# Patient Record
Sex: FEMALE | Race: WHITE | NOT HISPANIC OR LATINO | Employment: FULL TIME | URBAN - METROPOLITAN AREA
[De-identification: names, ages, dates, MRNs, and addresses within clinical notes are randomized per-mention and may not be internally consistent; named-entity substitution may affect disease eponyms.]

---

## 2024-04-29 RX ORDER — HYDROXYZINE HYDROCHLORIDE 25 MG/1
25 TABLET, FILM COATED ORAL 3 TIMES DAILY PRN
COMMUNITY

## 2024-04-29 RX ORDER — FERROUS SULFATE 325(65) MG
325 TABLET ORAL
COMMUNITY

## 2024-04-29 RX ORDER — SEMAGLUTIDE 1.34 MG/ML
INJECTION, SOLUTION SUBCUTANEOUS WEEKLY
COMMUNITY

## 2024-04-29 NOTE — PROGRESS NOTES
Hematology/Oncology Outpatient Consultation    Patient name: Chanda Garcia  : 1990  MRN: [id number]  Primary Care Physician: Nadine Rod APRN  Referring Physician: Nadine Rod APRN  Reason For Consult:     Chief Complaint   Patient presents with    Appointment     Elevated platelet count       History of Present Illness:    This is a 33-year-old female has referred secondary to thrombocytosis.  Review of her CBC 2024 white count was 8, hemoglobin 11.6 and platelets where 493 differential is where 60% neutrophils 29% lymphocytes there is no monocytosis eosinophilia or basophilia.  B12 was 1888, ferritin was 134 the rest of the iron panel was within normal limits    Today 2024, white count is 9.5 hemoglobin is 11.7 platelets are down to 355 and essentially unremarkable differentials.    Her menstrual cycle are q 28 days  and lasts 4 to 5 days each time.She denies any rectal  bleeding or blood in urine.    She has a long time history of anemia and has been on iron tablets one a day for 2 years.      She has been referred for thrombocytosis    She denies any smoking or alcohol consumption    She has family hx of cancer mat GF with melanoma,  Father with Kidney cancer, Mother with melanoma    Past Medical History:   Diagnosis Date    Anxiety and depression     Elevated platelet count     History of substance abuse     CAR (iron deficiency anemia)     Obesity     Panic attacks     Sciatica of left side     Vitamin D deficiency        No past surgical history on file.      Current Outpatient Medications:     Cholecalciferol 25 MCG (1000 UT) tablet, Take 1 tablet by mouth Daily., Disp: , Rfl:     Cyanocobalamin 500 MCG/0.1ML solution, into the nostril(s) as directed by provider., Disp: , Rfl:     escitalopram (LEXAPRO) 20 MG tablet, Take 1 tablet by mouth Daily., Disp: , Rfl:     ferrous sulfate 325 (65 FE) MG tablet, Take 1 tablet by mouth Daily With Breakfast., Disp: , Rfl:     hydrOXYzine  (ATARAX) 25 MG tablet, Take 1 tablet by mouth 3 (Three) Times a Day As Needed for Itching., Disp: , Rfl:     meloxicam (MOBIC) 15 MG tablet, Take 1 tablet by mouth Daily., Disp: , Rfl:     Semaglutide,0.25 or 0.5MG/DOS, (Ozempic, 0.25 or 0.5 MG/DOSE,) 2 MG/1.5ML solution pen-injector, Inject  under the skin into the appropriate area as directed 1 (One) Time Per Week., Disp: , Rfl:     hydroCHLOROthiazide 12.5 MG tablet, Take 1 tablet by mouth Daily. (Patient not taking: Reported on 5/2/2024), Disp: , Rfl:     hydrOXYzine pamoate (VISTARIL) 50 MG capsule, Take 1 capsule by mouth Every 8 (Eight) Hours As Needed. (Patient not taking: Reported on 5/2/2024), Disp: , Rfl:     ondansetron (ZOFRAN) 4 MG tablet, TAKE 1 TABLET BY MOUTH EVERY 4-6 HOURS AS NEEDED FOR NAUSEA (Patient not taking: Reported on 5/2/2024), Disp: , Rfl:     No Known Allergies      There is no immunization history on file for this patient.    No family history on file.    Cancer-related family history is not on file.         ROS:    Review of Systems   Constitutional:  Negative for chills, fatigue and fever.   HENT:  Negative for congestion, drooling, ear discharge, rhinorrhea, sinus pressure and tinnitus.    Eyes:  Negative for photophobia, pain and discharge.   Respiratory:  Negative for apnea, choking and stridor.    Cardiovascular:  Negative for palpitations.   Gastrointestinal:  Negative for abdominal distention, abdominal pain and anal bleeding.   Endocrine: Negative for polydipsia and polyphagia.   Genitourinary:  Negative for decreased urine volume, flank pain and genital sores.   Musculoskeletal:  Negative for gait problem, neck pain and neck stiffness.   Skin:  Negative for color change, rash and wound.   Neurological:  Negative for tremors, seizures, syncope, facial asymmetry and speech difficulty.   Hematological:  Negative for adenopathy.   Psychiatric/Behavioral:  Negative for agitation, confusion, hallucinations and self-injury. The  "patient is not hyperactive.        Objective:    Vitals:    05/02/24 0853   BP: 104/72   Pulse: 87   Resp: 18   Temp: 98 °F (36.7 °C)   TempSrc: Infrared   SpO2: 98%   Weight: 89.4 kg (197 lb)   Height: 157.5 cm (62\")   PainSc: 0-No pain     Body mass index is 36.03 kg/m².    ECOG    (0) Fully active, able to carry on all predisease performance without restriction    Physical Exam:  Physical Exam  Vitals and nursing note reviewed.   Constitutional:       General: She is not in acute distress.     Appearance: She is not diaphoretic.   HENT:      Head: Normocephalic and atraumatic.   Eyes:      General: No scleral icterus.        Right eye: No discharge.         Left eye: No discharge.      Conjunctiva/sclera: Conjunctivae normal.   Neck:      Thyroid: No thyromegaly.   Cardiovascular:      Rate and Rhythm: Normal rate and regular rhythm.      Heart sounds: Normal heart sounds.      No friction rub. No gallop.   Pulmonary:      Effort: Pulmonary effort is normal. No respiratory distress.      Breath sounds: No stridor. No wheezing.   Abdominal:      General: Bowel sounds are normal.      Palpations: Abdomen is soft. There is no mass.      Tenderness: There is no abdominal tenderness. There is no guarding or rebound.   Musculoskeletal:         General: No tenderness. Normal range of motion.      Cervical back: Normal range of motion and neck supple.   Lymphadenopathy:      Cervical: No cervical adenopathy.   Skin:     General: Skin is warm.      Findings: No erythema or rash.   Neurological:      Mental Status: She is alert and oriented to person, place, and time.      Motor: No abnormal muscle tone.   Psychiatric:         Behavior: Behavior normal.         RECENT LABS  WBC   Date Value Ref Range Status   05/02/2024 9.59 3.40 - 10.80 10*3/mm3 Final     RBC   Date Value Ref Range Status   05/02/2024 4.22 3.77 - 5.28 10*6/mm3 Final     Hemoglobin   Date Value Ref Range Status   05/02/2024 11.7 (L) 12.0 - 15.9 g/dL Final " "    Hematocrit   Date Value Ref Range Status   05/02/2024 35.2 34.0 - 46.6 % Final     MCV   Date Value Ref Range Status   05/02/2024 83.4 79.0 - 97.0 fL Final     MCH   Date Value Ref Range Status   05/02/2024 27.7 26.6 - 33.0 pg Final     MCHC   Date Value Ref Range Status   05/02/2024 33.2 31.5 - 35.7 g/dL Final     RDW   Date Value Ref Range Status   05/02/2024 13.8 12.3 - 15.4 % Final     RDW-SD   Date Value Ref Range Status   05/02/2024 40.9 37.0 - 54.0 fl Final     MPV   Date Value Ref Range Status   05/02/2024 10.2 6.0 - 12.0 fL Final     Platelets   Date Value Ref Range Status   05/02/2024 355 140 - 450 10*3/mm3 Final     Neutrophil %   Date Value Ref Range Status   05/02/2024 51.1 42.7 - 76.0 % Final     Lymphocyte %   Date Value Ref Range Status   05/02/2024 37.5 19.6 - 45.3 % Final     Monocyte %   Date Value Ref Range Status   05/02/2024 8.1 5.0 - 12.0 % Final     Eosinophil %   Date Value Ref Range Status   05/02/2024 3.0 0.3 - 6.2 % Final     Basophil %   Date Value Ref Range Status   05/02/2024 0.3 0.0 - 1.5 % Final     Neutrophils, Absolute   Date Value Ref Range Status   05/02/2024 4.89 1.70 - 7.00 10*3/mm3 Final     Lymphocytes, Absolute   Date Value Ref Range Status   05/02/2024 3.60 (H) 0.70 - 3.10 10*3/mm3 Final     Monocytes, Absolute   Date Value Ref Range Status   05/02/2024 0.78 0.10 - 0.90 10*3/mm3 Final     Eosinophils, Absolute   Date Value Ref Range Status   05/02/2024 0.29 0.00 - 0.40 10*3/mm3 Final     Basophils, Absolute   Date Value Ref Range Status   05/02/2024 0.03 0.00 - 0.20 10*3/mm3 Final       No results found for: \"GLUCOSE\", \"BUN\", \"CREATININE\", \"EGFRIFNONA\", \"EGFRIFAFRI\", \"BCR\", \"K\", \"CO2\", \"CALCIUM\", \"PROTENTOTREF\", \"ALBUMIN\", \"LABIL2\", \"BILIRUBIN\", \"AST\", \"ALT\"      Assessment & Plan   Elevated platelet count  - CBC & Differential      Normocytic anemia, suspect iron deficiency  Thrombocytosis which is most likely reactive        Plans:        Will complete anemia workup " by checking serum transferrin receptor assay, MMA, SPEP with DEWEY reticulocyte count LDH and haptoglobin  Also included his JAK2 analysis, BCR-ABL and serum EPO level  Follow-up 4 weeks  CBC reviewed today her white count is 9.5 hemoglobin 11.7 platelets 355 which is normal  All questions answered          I spent 45 total minutes, face-to-face, caring for Chanda today. 90% of this time involved counseling and/or coordination of care as documented within this note.

## 2024-05-02 ENCOUNTER — CONSULT (OUTPATIENT)
Dept: ONCOLOGY | Facility: CLINIC | Age: 34
End: 2024-05-02
Payer: COMMERCIAL

## 2024-05-02 ENCOUNTER — LAB (OUTPATIENT)
Dept: LAB | Facility: HOSPITAL | Age: 34
End: 2024-05-02
Payer: COMMERCIAL

## 2024-05-02 VITALS
TEMPERATURE: 98 F | DIASTOLIC BLOOD PRESSURE: 72 MMHG | SYSTOLIC BLOOD PRESSURE: 104 MMHG | HEIGHT: 62 IN | WEIGHT: 197 LBS | OXYGEN SATURATION: 98 % | BODY MASS INDEX: 36.25 KG/M2 | RESPIRATION RATE: 18 BRPM | HEART RATE: 87 BPM

## 2024-05-02 DIAGNOSIS — R79.89 ELEVATED PLATELET COUNT: Primary | ICD-10-CM

## 2024-05-02 DIAGNOSIS — R79.89 ELEVATED PLATELET COUNT: ICD-10-CM

## 2024-05-02 PROBLEM — F11.20 OPIOID DEPENDENCE: Status: ACTIVE | Noted: 2017-07-31

## 2024-05-02 PROBLEM — R10.11 RIGHT UPPER QUADRANT PAIN: Status: ACTIVE | Noted: 2017-07-31

## 2024-05-02 PROBLEM — K21.9 GASTROESOPHAGEAL REFLUX DISEASE: Status: ACTIVE | Noted: 2017-09-07

## 2024-05-02 PROBLEM — R03.0 FINDING OF ABOVE NORMAL BLOOD PRESSURE: Status: ACTIVE | Noted: 2017-07-31

## 2024-05-02 PROBLEM — F17.210 CIGARETTE SMOKER: Status: ACTIVE | Noted: 2017-07-31

## 2024-05-02 PROBLEM — I10 ESSENTIAL HYPERTENSION: Status: ACTIVE | Noted: 2018-12-28

## 2024-05-02 PROBLEM — R74.8 ABNORMAL LIVER ENZYMES: Status: ACTIVE | Noted: 2017-07-31

## 2024-05-02 PROBLEM — F10.20 ALCOHOLISM: Status: ACTIVE | Noted: 2017-07-31

## 2024-05-02 LAB
BASOPHILS # BLD AUTO: 0.03 10*3/MM3 (ref 0–0.2)
BASOPHILS NFR BLD AUTO: 0.3 % (ref 0–1.5)
DEPRECATED RDW RBC AUTO: 40.9 FL (ref 37–54)
EOSINOPHIL # BLD AUTO: 0.29 10*3/MM3 (ref 0–0.4)
EOSINOPHIL NFR BLD AUTO: 3 % (ref 0.3–6.2)
ERYTHROCYTE [DISTWIDTH] IN BLOOD BY AUTOMATED COUNT: 13.8 % (ref 12.3–15.4)
HAPTOGLOB SERPL-MCNC: 193 MG/DL (ref 30–200)
HCT VFR BLD AUTO: 35.2 % (ref 34–46.6)
HGB BLD-MCNC: 11.7 G/DL (ref 12–15.9)
LDH SERPL-CCNC: 210 U/L (ref 135–214)
LYMPHOCYTES # BLD AUTO: 3.6 10*3/MM3 (ref 0.7–3.1)
LYMPHOCYTES NFR BLD AUTO: 37.5 % (ref 19.6–45.3)
MCH RBC QN AUTO: 27.7 PG (ref 26.6–33)
MCHC RBC AUTO-ENTMCNC: 33.2 G/DL (ref 31.5–35.7)
MCV RBC AUTO: 83.4 FL (ref 79–97)
MONOCYTES # BLD AUTO: 0.78 10*3/MM3 (ref 0.1–0.9)
MONOCYTES NFR BLD AUTO: 8.1 % (ref 5–12)
NEUTROPHILS NFR BLD AUTO: 4.89 10*3/MM3 (ref 1.7–7)
NEUTROPHILS NFR BLD AUTO: 51.1 % (ref 42.7–76)
PLATELET # BLD AUTO: 355 10*3/MM3 (ref 140–450)
PMV BLD AUTO: 10.2 FL (ref 6–12)
RBC # BLD AUTO: 4.22 10*6/MM3 (ref 3.77–5.28)
RETICS # AUTO: 0.06 10*6/MM3 (ref 0.02–0.13)
RETICS/RBC NFR AUTO: 1.42 % (ref 0.7–1.9)
WBC NRBC COR # BLD AUTO: 9.59 10*3/MM3 (ref 3.4–10.8)

## 2024-05-02 PROCEDURE — 85045 AUTOMATED RETICULOCYTE COUNT: CPT | Performed by: INTERNAL MEDICINE

## 2024-05-02 PROCEDURE — 85025 COMPLETE CBC W/AUTO DIFF WBC: CPT

## 2024-05-02 PROCEDURE — 83615 LACTATE (LD) (LDH) ENZYME: CPT | Performed by: INTERNAL MEDICINE

## 2024-05-02 PROCEDURE — 36415 COLL VENOUS BLD VENIPUNCTURE: CPT

## 2024-05-02 PROCEDURE — 83010 ASSAY OF HAPTOGLOBIN QUANT: CPT | Performed by: INTERNAL MEDICINE

## 2024-05-02 RX ORDER — HYDROCHLOROTHIAZIDE 12.5 MG/1
1 TABLET ORAL DAILY
COMMUNITY

## 2024-05-02 RX ORDER — MELATONIN
1000 DAILY
COMMUNITY

## 2024-05-02 RX ORDER — HYDROXYZINE PAMOATE 50 MG/1
1 CAPSULE ORAL EVERY 8 HOURS PRN
COMMUNITY

## 2024-05-02 RX ORDER — ONDANSETRON 4 MG/1
TABLET, FILM COATED ORAL
COMMUNITY
Start: 2024-04-19

## 2024-05-02 RX ORDER — CYANOCOBALAMIN 500 UG/1
SPRAY, METERED NASAL
COMMUNITY

## 2024-05-02 RX ORDER — ESCITALOPRAM OXALATE 20 MG/1
1 TABLET ORAL DAILY
COMMUNITY
Start: 2024-04-19

## 2024-05-02 RX ORDER — MELOXICAM 15 MG/1
15 TABLET ORAL DAILY
COMMUNITY

## 2024-05-02 NOTE — LETTER
May 3, 2024     CHRISTO Desouza  [address] [place] [address]    Patient: Chanda Garcia   YOB: 1990   Date of Visit: 2024     Dear CHRISTO Desouza:       Thank you for referring Chanda Garcia to me for evaluation. Below are the relevant portions of my assessment and plan of care.    If you have questions, please do not hesitate to call me. I look forward to following Chanda along with you.         Sincerely,        Roseline Yuan MD        CC: No Recipients    Roseline Yuan MD  24 [id number]  Sign when Signing Visit   Hematology/Oncology Outpatient Consultation    Patient name: Chanda Garcia  : 1990  MRN: [id number]  Primary Care Physician: Nadine Rod APRN  Referring Physician: Nadine Rod APRN  Reason For Consult:     Chief Complaint   Patient presents with   • Appointment     Elevated platelet count       History of Present Illness:    This is a 33-year-old female has referred secondary to thrombocytosis.  Review of her CBC 2024 white count was 8, hemoglobin 11.6 and platelets where 493 differential is where 60% neutrophils 29% lymphocytes there is no monocytosis eosinophilia or basophilia.  B12 was 1888, ferritin was 134 the rest of the iron panel was within normal limits    Today 2024, white count is 9.5 hemoglobin is 11.7 platelets are down to 355 and essentially unremarkable differentials.    Her menstrual cycle are q 28 days  and lasts 4 to 5 days each time.She denies any rectal  bleeding or blood in urine.    She has a long time history of anemia and has been on iron tablets one a day for 2 years.      She has been referred for thrombocytosis    She denies any smoking or alcohol consumption    She has family hx of cancer mat GF with melanoma,  Father with Kidney cancer, Mother with melanoma    Past Medical History:   Diagnosis Date   • Anxiety and depression    • Elevated platelet count     • History of substance abuse    • CAR (iron deficiency anemia)    • Obesity    • Panic attacks    • Sciatica of left side    • Vitamin D deficiency        No past surgical history on file.      Current Outpatient Medications:   •  Cholecalciferol 25 MCG (1000 UT) tablet, Take 1 tablet by mouth Daily., Disp: , Rfl:   •  Cyanocobalamin 500 MCG/0.1ML solution, into the nostril(s) as directed by provider., Disp: , Rfl:   •  escitalopram (LEXAPRO) 20 MG tablet, Take 1 tablet by mouth Daily., Disp: , Rfl:   •  ferrous sulfate 325 (65 FE) MG tablet, Take 1 tablet by mouth Daily With Breakfast., Disp: , Rfl:   •  hydrOXYzine (ATARAX) 25 MG tablet, Take 1 tablet by mouth 3 (Three) Times a Day As Needed for Itching., Disp: , Rfl:   •  meloxicam (MOBIC) 15 MG tablet, Take 1 tablet by mouth Daily., Disp: , Rfl:   •  Semaglutide,0.25 or 0.5MG/DOS, (Ozempic, 0.25 or 0.5 MG/DOSE,) 2 MG/1.5ML solution pen-injector, Inject  under the skin into the appropriate area as directed 1 (One) Time Per Week., Disp: , Rfl:   •  hydroCHLOROthiazide 12.5 MG tablet, Take 1 tablet by mouth Daily. (Patient not taking: Reported on 5/2/2024), Disp: , Rfl:   •  hydrOXYzine pamoate (VISTARIL) 50 MG capsule, Take 1 capsule by mouth Every 8 (Eight) Hours As Needed. (Patient not taking: Reported on 5/2/2024), Disp: , Rfl:   •  ondansetron (ZOFRAN) 4 MG tablet, TAKE 1 TABLET BY MOUTH EVERY 4-6 HOURS AS NEEDED FOR NAUSEA (Patient not taking: Reported on 5/2/2024), Disp: , Rfl:     No Known Allergies      There is no immunization history on file for this patient.    No family history on file.    Cancer-related family history is not on file.         ROS:    Review of Systems   Constitutional:  Negative for chills, fatigue and fever.   HENT:  Negative for congestion, drooling, ear discharge, rhinorrhea, sinus pressure and tinnitus.    Eyes:  Negative for photophobia, pain and discharge.   Respiratory:  Negative for apnea, choking and stridor.   "  Cardiovascular:  Negative for palpitations.   Gastrointestinal:  Negative for abdominal distention, abdominal pain and anal bleeding.   Endocrine: Negative for polydipsia and polyphagia.   Genitourinary:  Negative for decreased urine volume, flank pain and genital sores.   Musculoskeletal:  Negative for gait problem, neck pain and neck stiffness.   Skin:  Negative for color change, rash and wound.   Neurological:  Negative for tremors, seizures, syncope, facial asymmetry and speech difficulty.   Hematological:  Negative for adenopathy.   Psychiatric/Behavioral:  Negative for agitation, confusion, hallucinations and self-injury. The patient is not hyperactive.        Objective:    Vitals:    05/02/24 0853   BP: 104/72   Pulse: 87   Resp: 18   Temp: 98 °F (36.7 °C)   TempSrc: Infrared   SpO2: 98%   Weight: 89.4 kg (197 lb)   Height: 157.5 cm (62\")   PainSc: 0-No pain     Body mass index is 36.03 kg/m².    ECOG    (0) Fully active, able to carry on all predisease performance without restriction    Physical Exam:  Physical Exam  Vitals and nursing note reviewed.   Constitutional:       General: She is not in acute distress.     Appearance: She is not diaphoretic.   HENT:      Head: Normocephalic and atraumatic.   Eyes:      General: No scleral icterus.        Right eye: No discharge.         Left eye: No discharge.      Conjunctiva/sclera: Conjunctivae normal.   Neck:      Thyroid: No thyromegaly.   Cardiovascular:      Rate and Rhythm: Normal rate and regular rhythm.      Heart sounds: Normal heart sounds.      No friction rub. No gallop.   Pulmonary:      Effort: Pulmonary effort is normal. No respiratory distress.      Breath sounds: No stridor. No wheezing.   Abdominal:      General: Bowel sounds are normal.      Palpations: Abdomen is soft. There is no mass.      Tenderness: There is no abdominal tenderness. There is no guarding or rebound.   Musculoskeletal:         General: No tenderness. Normal range of " motion.      Cervical back: Normal range of motion and neck supple.   Lymphadenopathy:      Cervical: No cervical adenopathy.   Skin:     General: Skin is warm.      Findings: No erythema or rash.   Neurological:      Mental Status: She is alert and oriented to person, place, and time.      Motor: No abnormal muscle tone.   Psychiatric:         Behavior: Behavior normal.         RECENT LABS  WBC   Date Value Ref Range Status   05/02/2024 9.59 3.40 - 10.80 10*3/mm3 Final     RBC   Date Value Ref Range Status   05/02/2024 4.22 3.77 - 5.28 10*6/mm3 Final     Hemoglobin   Date Value Ref Range Status   05/02/2024 11.7 (L) 12.0 - 15.9 g/dL Final     Hematocrit   Date Value Ref Range Status   05/02/2024 35.2 34.0 - 46.6 % Final     MCV   Date Value Ref Range Status   05/02/2024 83.4 79.0 - 97.0 fL Final     MCH   Date Value Ref Range Status   05/02/2024 27.7 26.6 - 33.0 pg Final     MCHC   Date Value Ref Range Status   05/02/2024 33.2 31.5 - 35.7 g/dL Final     RDW   Date Value Ref Range Status   05/02/2024 13.8 12.3 - 15.4 % Final     RDW-SD   Date Value Ref Range Status   05/02/2024 40.9 37.0 - 54.0 fl Final     MPV   Date Value Ref Range Status   05/02/2024 10.2 6.0 - 12.0 fL Final     Platelets   Date Value Ref Range Status   05/02/2024 355 140 - 450 10*3/mm3 Final     Neutrophil %   Date Value Ref Range Status   05/02/2024 51.1 42.7 - 76.0 % Final     Lymphocyte %   Date Value Ref Range Status   05/02/2024 37.5 19.6 - 45.3 % Final     Monocyte %   Date Value Ref Range Status   05/02/2024 8.1 5.0 - 12.0 % Final     Eosinophil %   Date Value Ref Range Status   05/02/2024 3.0 0.3 - 6.2 % Final     Basophil %   Date Value Ref Range Status   05/02/2024 0.3 0.0 - 1.5 % Final     Neutrophils, Absolute   Date Value Ref Range Status   05/02/2024 4.89 1.70 - 7.00 10*3/mm3 Final     Lymphocytes, Absolute   Date Value Ref Range Status   05/02/2024 3.60 (H) 0.70 - 3.10 10*3/mm3 Final     Monocytes, Absolute   Date Value Ref Range  "Status   05/02/2024 0.78 0.10 - 0.90 10*3/mm3 Final     Eosinophils, Absolute   Date Value Ref Range Status   05/02/2024 0.29 0.00 - 0.40 10*3/mm3 Final     Basophils, Absolute   Date Value Ref Range Status   05/02/2024 0.03 0.00 - 0.20 10*3/mm3 Final       No results found for: \"GLUCOSE\", \"BUN\", \"CREATININE\", \"EGFRIFNONA\", \"EGFRIFAFRI\", \"BCR\", \"K\", \"CO2\", \"CALCIUM\", \"PROTENTOTREF\", \"ALBUMIN\", \"LABIL2\", \"BILIRUBIN\", \"AST\", \"ALT\"      Assessment & Plan  Elevated platelet count  - CBC & Differential      Normocytic anemia, suspect iron deficiency  Thrombocytosis which is most likely reactive        Plans:        Will complete anemia workup by checking serum transferrin receptor assay, MMA, SPEP with DEWEY reticulocyte count LDH and haptoglobin  Also included his JAK2 analysis, BCR-ABL and serum EPO level  Follow-up 4 weeks  CBC reviewed today her white count is 9.5 hemoglobin 11.7 platelets 355 which is normal  All questions answered          I spent 45 total minutes, face-to-face, caring for Chanda today. 90% of this time involved counseling and/or coordination of care as documented within this note.         "

## 2024-05-03 ENCOUNTER — TELEPHONE (OUTPATIENT)
Dept: ONCOLOGY | Facility: CLINIC | Age: 34
End: 2024-05-03
Payer: COMMERCIAL

## 2024-05-03 LAB
ALBUMIN SERPL ELPH-MCNC: 3.5 G/DL (ref 2.9–4.4)
ALBUMIN/GLOB SERPL: 1.1 {RATIO} (ref 0.7–1.7)
ALPHA1 GLOB SERPL ELPH-MCNC: 0.2 G/DL (ref 0–0.4)
ALPHA2 GLOB SERPL ELPH-MCNC: 0.8 G/DL (ref 0.4–1)
B-GLOBULIN SERPL ELPH-MCNC: 0.9 G/DL (ref 0.7–1.3)
EPO SERPL-ACNC: 12 MIU/ML (ref 2.6–18.5)
GAMMA GLOB SERPL ELPH-MCNC: 1.3 G/DL (ref 0.4–1.8)
GLOBULIN SER CALC-MCNC: 3.1 G/DL (ref 2.2–3.9)
LABORATORY COMMENT REPORT: NORMAL
M PROTEIN SERPL ELPH-MCNC: NORMAL G/DL
PROT PATTERN SERPL ELPH-IMP: NORMAL
PROT SERPL-MCNC: 6.6 G/DL (ref 6–8.5)

## 2024-05-05 LAB — STFR SERPL-SCNC: 15.4 NMOL/L (ref 12.2–27.3)

## 2024-05-07 LAB — METHYLMALONATE SERPL-SCNC: 148 NMOL/L (ref 0–378)

## 2024-05-09 LAB
INTERPRETATION: NEGATIVE
LAB DIRECTOR NAME PROVIDER: NORMAL
LABORATORY COMMENT REPORT: NORMAL
REF LAB TEST METHOD: NORMAL
T(ABL1,BCR)B2A2/CONTROL BLD/T: NORMAL %
T(ABL1,BCR)B3A2/CONTROL BLD/T: NORMAL %
T(ABL1,BCR)E1A2/CONTROL BLD/T: NORMAL %

## 2024-05-12 LAB
CITATION REF LAB TEST: NORMAL
JAK2 P.V617F BLD/T QL: NORMAL
LAB DIRECTOR NAME PROVIDER: NORMAL
LABORATORY COMMENT REPORT: NORMAL
REF LAB TEST METHOD: NORMAL

## 2024-05-29 ENCOUNTER — LAB (OUTPATIENT)
Dept: LAB | Facility: HOSPITAL | Age: 34
End: 2024-05-29
Payer: COMMERCIAL

## 2024-05-29 ENCOUNTER — OFFICE VISIT (OUTPATIENT)
Dept: ONCOLOGY | Facility: CLINIC | Age: 34
End: 2024-05-29
Payer: COMMERCIAL

## 2024-05-29 VITALS
RESPIRATION RATE: 18 BRPM | WEIGHT: 193 LBS | BODY MASS INDEX: 35.51 KG/M2 | HEART RATE: 81 BPM | DIASTOLIC BLOOD PRESSURE: 75 MMHG | SYSTOLIC BLOOD PRESSURE: 110 MMHG | HEIGHT: 62 IN | OXYGEN SATURATION: 98 % | TEMPERATURE: 98 F

## 2024-05-29 DIAGNOSIS — R79.89 ELEVATED PLATELET COUNT: Primary | ICD-10-CM

## 2024-05-29 LAB
ANION GAP SERPL CALCULATED.3IONS-SCNC: 11.1 MMOL/L (ref 5–15)
BASOPHILS # BLD AUTO: 0.03 10*3/MM3 (ref 0–0.2)
BASOPHILS NFR BLD AUTO: 0.4 % (ref 0–1.5)
BUN SERPL-MCNC: 14 MG/DL (ref 6–20)
BUN/CREAT SERPL: 16.7 (ref 7–25)
CALCIUM SPEC-SCNC: 9.5 MG/DL (ref 8.6–10.5)
CHLORIDE SERPL-SCNC: 105 MMOL/L (ref 98–107)
CO2 SERPL-SCNC: 24.9 MMOL/L (ref 22–29)
CREAT SERPL-MCNC: 0.84 MG/DL (ref 0.57–1)
CRP SERPL-MCNC: <0.3 MG/DL (ref 0–0.5)
DEPRECATED RDW RBC AUTO: 40.9 FL (ref 37–54)
EGFRCR SERPLBLD CKD-EPI 2021: 93.1 ML/MIN/1.73
EOSINOPHIL # BLD AUTO: 0.23 10*3/MM3 (ref 0–0.4)
EOSINOPHIL NFR BLD AUTO: 3.2 % (ref 0.3–6.2)
ERYTHROCYTE [DISTWIDTH] IN BLOOD BY AUTOMATED COUNT: 13.6 % (ref 12.3–15.4)
ERYTHROCYTE [SEDIMENTATION RATE] IN BLOOD: 9 MM/HR (ref 0–20)
FOLATE SERPL-MCNC: 13.4 NG/ML (ref 4.78–24.2)
GLUCOSE SERPL-MCNC: 89 MG/DL (ref 65–99)
HCT VFR BLD AUTO: 34.2 % (ref 34–46.6)
HGB BLD-MCNC: 11.1 G/DL (ref 12–15.9)
HOLD SPECIMEN: NORMAL
LYMPHOCYTES # BLD AUTO: 2.26 10*3/MM3 (ref 0.7–3.1)
LYMPHOCYTES NFR BLD AUTO: 31.2 % (ref 19.6–45.3)
MCH RBC QN AUTO: 27.4 PG (ref 26.6–33)
MCHC RBC AUTO-ENTMCNC: 32.5 G/DL (ref 31.5–35.7)
MCV RBC AUTO: 84.4 FL (ref 79–97)
MONOCYTES # BLD AUTO: 0.56 10*3/MM3 (ref 0.1–0.9)
MONOCYTES NFR BLD AUTO: 7.7 % (ref 5–12)
NEUTROPHILS NFR BLD AUTO: 4.17 10*3/MM3 (ref 1.7–7)
NEUTROPHILS NFR BLD AUTO: 57.5 % (ref 42.7–76)
PLATELET # BLD AUTO: 404 10*3/MM3 (ref 140–450)
PMV BLD AUTO: 8.7 FL (ref 6–12)
POTASSIUM SERPL-SCNC: 4.4 MMOL/L (ref 3.5–5.2)
RBC # BLD AUTO: 4.05 10*6/MM3 (ref 3.77–5.28)
SODIUM SERPL-SCNC: 141 MMOL/L (ref 136–145)
WBC NRBC COR # BLD AUTO: 7.25 10*3/MM3 (ref 3.4–10.8)

## 2024-05-29 PROCEDURE — 86140 C-REACTIVE PROTEIN: CPT | Performed by: INTERNAL MEDICINE

## 2024-05-29 PROCEDURE — 85652 RBC SED RATE AUTOMATED: CPT | Performed by: INTERNAL MEDICINE

## 2024-05-29 PROCEDURE — 99214 OFFICE O/P EST MOD 30 MIN: CPT | Performed by: INTERNAL MEDICINE

## 2024-05-29 PROCEDURE — 85025 COMPLETE CBC W/AUTO DIFF WBC: CPT

## 2024-05-29 PROCEDURE — 36415 COLL VENOUS BLD VENIPUNCTURE: CPT

## 2024-05-29 PROCEDURE — 82746 ASSAY OF FOLIC ACID SERUM: CPT | Performed by: INTERNAL MEDICINE

## 2024-05-29 PROCEDURE — 80048 BASIC METABOLIC PNL TOTAL CA: CPT | Performed by: INTERNAL MEDICINE

## 2024-05-29 NOTE — PROGRESS NOTES
Hematology/Oncology Outpatient Follow Up    PATIENT NAME:Chanda Garcia  :1990  MRN: [id number]  PRIMARY CARE PHYSICIAN: Nadine Rod APRN  REFERRING PHYSICIAN: Nadine Rod APRN    Chief Complaint   Patient presents with    Follow-up     Elevated platelet count        HISTORY OF PRESENT ILLNESS:     This is a 33-year-old female has referred secondary to thrombocytosis.  Review of her CBC 2024 white count was 8, hemoglobin 11.6 and platelets where 493 differential is where 60% neutrophils 29% lymphocytes there is no monocytosis eosinophilia or basophilia.  B12 was 1888, ferritin was 134 the rest of the iron panel was within normal limits     Today 2024, white count is 9.5 hemoglobin is 11.7 platelets are down to 355 and essentially unremarkable differentials.     Her menstrual cycle are q 28 days  and lasts 4 to 5 days each time.She denies any rectal  bleeding or blood in urine.     She has a long time history of anemia and has been on iron tablets one a day for 2 years.        She has been referred for thrombocytosis     She denies any smoking or alcohol consumption     She has family hx of cancer, mat GF with melanoma,  Father with Kidney cancer, Mother with melanoma    2024: Patient had additional workup for thrombocytosis with erythropoietin level which was normal at 12, transferrin receptor assay was normal at 15.4, methylmalonic acid level was normal at 148, SPEP with DEWEY did not show any monoclonal protein, reticulocyte count is normal at 1.42, LDH is 210 normal, haptoglobin is 193, JAK2 analysis was negative for any mutation, BCR-ABL was negative, white count is 9.5, hemoglobin 11.7, platelets are 355 with essentially unremarkable differentials.    Past Medical History:   Diagnosis Date    Anxiety and depression     Elevated platelet count     History of substance abuse     CAR (iron deficiency anemia)     Obesity     Panic attacks     Sciatica of left side     Vitamin D deficiency         No past surgical history on file.      Current Outpatient Medications:     Cholecalciferol 25 MCG (1000 UT) tablet, Take 1 tablet by mouth Daily., Disp: , Rfl:     escitalopram (LEXAPRO) 20 MG tablet, Take 1 tablet by mouth Daily., Disp: , Rfl:     ferrous sulfate 325 (65 FE) MG tablet, Take 1 tablet by mouth Daily With Breakfast., Disp: , Rfl:     hydrOXYzine (ATARAX) 25 MG tablet, Take 1 tablet by mouth 3 (Three) Times a Day As Needed for Itching., Disp: , Rfl:     meloxicam (MOBIC) 15 MG tablet, Take 1 tablet by mouth Daily., Disp: , Rfl:     ondansetron (ZOFRAN) 4 MG tablet, , Disp: , Rfl:     Semaglutide,0.25 or 0.5MG/DOS, (Ozempic, 0.25 or 0.5 MG/DOSE,) 2 MG/1.5ML solution pen-injector, Inject  under the skin into the appropriate area as directed 1 (One) Time Per Week., Disp: , Rfl:     No Known Allergies    No family history on file.    Cancer-related family history is not on file.    Social History     Tobacco Use    Smoking status: Never    Smokeless tobacco: Never       I have reviewed and confirmed the accuracy of the patient's history: Chief complaint, HPI, ROS, and Subjective as entered by the MA/LPN/RN. Roseline Yuan MD 05/29/24        SUBJECTIVE:    Patient is here today for follow-up and she does not have any new issues.        REVIEW OF SYSTEMS:    Review of Systems   Constitutional:  Negative for chills, fatigue and fever.   HENT:  Negative for congestion, drooling, ear discharge, rhinorrhea, sinus pressure and tinnitus.    Eyes:  Negative for photophobia, pain and discharge.   Respiratory:  Negative for apnea, choking and stridor.    Cardiovascular:  Negative for palpitations.   Gastrointestinal:  Negative for abdominal distention, abdominal pain and anal bleeding.   Endocrine: Negative for polydipsia and polyphagia.   Genitourinary:  Negative for decreased urine volume, flank pain and genital sores.   Musculoskeletal:  Negative for gait problem, neck pain and neck stiffness.  "  Skin:  Negative for color change, rash and wound.   Neurological:  Negative for tremors, seizures, syncope, facial asymmetry and speech difficulty.   Hematological:  Negative for adenopathy.   Psychiatric/Behavioral:  Negative for agitation, confusion, hallucinations and self-injury. The patient is not hyperactive.        OBJECTIVE:    Vitals:    05/29/24 1140   BP: 110/75   Pulse: 81   Resp: 18   Temp: 98 °F (36.7 °C)   TempSrc: Infrared   SpO2: 98%   Weight: 87.5 kg (193 lb)   Height: 157.5 cm (62\")   PainSc: 0-No pain     Body mass index is 35.3 kg/m².    ECOG  (0) Fully active, able to carry on all predisease performance without restriction    Physical Exam  Vitals and nursing note reviewed.   Constitutional:       General: She is not in acute distress.     Appearance: She is not diaphoretic.   HENT:      Head: Normocephalic and atraumatic.   Eyes:      General: No scleral icterus.        Right eye: No discharge.         Left eye: No discharge.      Conjunctiva/sclera: Conjunctivae normal.   Neck:      Thyroid: No thyromegaly.   Cardiovascular:      Rate and Rhythm: Normal rate and regular rhythm.      Heart sounds: Normal heart sounds.      No friction rub. No gallop.   Pulmonary:      Effort: Pulmonary effort is normal. No respiratory distress.      Breath sounds: No stridor. No wheezing.   Abdominal:      General: Bowel sounds are normal.      Palpations: Abdomen is soft. There is no mass.      Tenderness: There is no abdominal tenderness. There is no guarding or rebound.   Musculoskeletal:         General: No tenderness. Normal range of motion.      Cervical back: Normal range of motion and neck supple.   Lymphadenopathy:      Cervical: No cervical adenopathy.   Skin:     General: Skin is warm.      Findings: No erythema or rash.   Neurological:      Mental Status: She is alert and oriented to person, place, and time.      Motor: No abnormal muscle tone.   Psychiatric:         Behavior: Behavior normal. "         RECENT LABS  WBC   Date Value Ref Range Status   05/29/2024 7.25 3.40 - 10.80 10*3/mm3 Final     RBC   Date Value Ref Range Status   05/29/2024 4.05 3.77 - 5.28 10*6/mm3 Final     Hemoglobin   Date Value Ref Range Status   05/29/2024 11.1 (L) 12.0 - 15.9 g/dL Final     Hematocrit   Date Value Ref Range Status   05/29/2024 34.2 34.0 - 46.6 % Final     MCV   Date Value Ref Range Status   05/29/2024 84.4 79.0 - 97.0 fL Final     MCH   Date Value Ref Range Status   05/29/2024 27.4 26.6 - 33.0 pg Final     MCHC   Date Value Ref Range Status   05/29/2024 32.5 31.5 - 35.7 g/dL Final     RDW   Date Value Ref Range Status   05/29/2024 13.6 12.3 - 15.4 % Final     RDW-SD   Date Value Ref Range Status   05/29/2024 40.9 37.0 - 54.0 fl Final     MPV   Date Value Ref Range Status   05/29/2024 8.7 6.0 - 12.0 fL Final     Platelets   Date Value Ref Range Status   05/29/2024 404 140 - 450 10*3/mm3 Final     Neutrophil %   Date Value Ref Range Status   05/29/2024 57.5 42.7 - 76.0 % Final     Lymphocyte %   Date Value Ref Range Status   05/29/2024 31.2 19.6 - 45.3 % Final     Monocyte %   Date Value Ref Range Status   05/29/2024 7.7 5.0 - 12.0 % Final     Eosinophil %   Date Value Ref Range Status   05/29/2024 3.2 0.3 - 6.2 % Final     Basophil %   Date Value Ref Range Status   05/29/2024 0.4 0.0 - 1.5 % Final     Neutrophils, Absolute   Date Value Ref Range Status   05/29/2024 4.17 1.70 - 7.00 10*3/mm3 Final     Lymphocytes, Absolute   Date Value Ref Range Status   05/29/2024 2.26 0.70 - 3.10 10*3/mm3 Final     Monocytes, Absolute   Date Value Ref Range Status   05/29/2024 0.56 0.10 - 0.90 10*3/mm3 Final     Eosinophils, Absolute   Date Value Ref Range Status   05/29/2024 0.23 0.00 - 0.40 10*3/mm3 Final     Basophils, Absolute   Date Value Ref Range Status   05/29/2024 0.03 0.00 - 0.20 10*3/mm3 Final       Lab Results   Component Value Date    PROTENTOTREF 6.6 05/02/2024    ALBUMIN 3.5 05/02/2024    LABIL2 1.1 05/02/2024          Assessment & Plan     Elevated platelet count  - CBC & Differential      ASSESSMENT:    Elevated platelet count  - CBC & Differential        Normocytic anemia, suspect iron deficiency lab parameters are essentially unremarkable.  Soluble transferrin receptor assay was normal and her CBC today shows persistent low hemoglobin at 11.1 g per DL  Thrombocytosis which is most likely reactive.  No evidence of myeloproliferative disease.  JAK2 was negative BCR-ABL and EPO level was normal.  Her platelets have normalized           Plans:           Checked anemia workup by checking serum transferrin receptor assay, MMA, SPEP with DEWEY reticulocyte count LDH and haptoglobin, all negative  Also included his JAK2 analysis, BCR-ABL and serum EPO level, no evidence of myeloproliferative disease  Will check hemoglobin electrophoresis, folate level as well as BMP today, sed rate and CRP,  if negative will continue to observe 5/28/2024   Follow up in 4 months  CBC reviewed today her white count is 9.5 hemoglobin 11.7 platelets 355 which is normal  All questions answered         I spent 30 total minutes, face-to-face, caring for Chanda today. 90% of this time involved counseling and/or coordination of care as documented within this note.

## 2024-09-25 ENCOUNTER — OFFICE VISIT (OUTPATIENT)
Dept: ONCOLOGY | Facility: CLINIC | Age: 34
End: 2024-09-25
Payer: COMMERCIAL

## 2024-09-25 ENCOUNTER — LAB (OUTPATIENT)
Dept: LAB | Facility: HOSPITAL | Age: 34
End: 2024-09-25
Payer: COMMERCIAL

## 2024-09-25 VITALS
HEIGHT: 62 IN | WEIGHT: 202 LBS | SYSTOLIC BLOOD PRESSURE: 112 MMHG | TEMPERATURE: 98 F | HEART RATE: 73 BPM | OXYGEN SATURATION: 97 % | RESPIRATION RATE: 18 BRPM | DIASTOLIC BLOOD PRESSURE: 71 MMHG | BODY MASS INDEX: 37.17 KG/M2

## 2024-09-25 DIAGNOSIS — R74.8 ABNORMAL LIVER ENZYMES: ICD-10-CM

## 2024-09-25 DIAGNOSIS — R79.89 ELEVATED PLATELET COUNT: Primary | ICD-10-CM

## 2024-09-25 LAB
BASOPHILS # BLD AUTO: 0.04 10*3/MM3 (ref 0–0.2)
BASOPHILS NFR BLD AUTO: 0.5 % (ref 0–1.5)
DEPRECATED RDW RBC AUTO: 42.8 FL (ref 37–54)
EOSINOPHIL # BLD AUTO: 0.3 10*3/MM3 (ref 0–0.4)
EOSINOPHIL NFR BLD AUTO: 3.5 % (ref 0.3–6.2)
ERYTHROCYTE [DISTWIDTH] IN BLOOD BY AUTOMATED COUNT: 14.1 % (ref 12.3–15.4)
FERRITIN SERPL-MCNC: 148 NG/ML (ref 13–150)
FOLATE SERPL-MCNC: 10.9 NG/ML (ref 4.78–24.2)
HAPTOGLOB SERPL-MCNC: 178 MG/DL (ref 30–200)
HCT VFR BLD AUTO: 33.9 % (ref 34–46.6)
HGB BLD-MCNC: 10.9 G/DL (ref 12–15.9)
HOLD SPECIMEN: NORMAL
IRON 24H UR-MRATE: 74 MCG/DL (ref 37–145)
IRON SATN MFR SERPL: 26 % (ref 20–50)
LDH SERPL-CCNC: 214 U/L (ref 135–214)
LYMPHOCYTES # BLD AUTO: 2.7 10*3/MM3 (ref 0.7–3.1)
LYMPHOCYTES NFR BLD AUTO: 31.1 % (ref 19.6–45.3)
MCH RBC QN AUTO: 27.3 PG (ref 26.6–33)
MCHC RBC AUTO-ENTMCNC: 32.2 G/DL (ref 31.5–35.7)
MCV RBC AUTO: 84.8 FL (ref 79–97)
MONOCYTES # BLD AUTO: 0.62 10*3/MM3 (ref 0.1–0.9)
MONOCYTES NFR BLD AUTO: 7.2 % (ref 5–12)
NEUTROPHILS NFR BLD AUTO: 5.01 10*3/MM3 (ref 1.7–7)
NEUTROPHILS NFR BLD AUTO: 57.7 % (ref 42.7–76)
PLATELET # BLD AUTO: 448 10*3/MM3 (ref 140–450)
PMV BLD AUTO: 9.1 FL (ref 6–12)
RBC # BLD AUTO: 4 10*6/MM3 (ref 3.77–5.28)
RETICS # AUTO: 0.08 10*6/MM3 (ref 0.02–0.13)
RETICS/RBC NFR AUTO: 1.99 % (ref 0.7–1.9)
TIBC SERPL-MCNC: 286 MCG/DL (ref 298–536)
TRANSFERRIN SERPL-MCNC: 192 MG/DL (ref 200–360)
VIT B12 BLD-MCNC: 1628 PG/ML (ref 211–946)
WBC NRBC COR # BLD AUTO: 8.67 10*3/MM3 (ref 3.4–10.8)

## 2024-09-25 PROCEDURE — 82607 VITAMIN B-12: CPT | Performed by: INTERNAL MEDICINE

## 2024-09-25 PROCEDURE — 82728 ASSAY OF FERRITIN: CPT | Performed by: INTERNAL MEDICINE

## 2024-09-25 PROCEDURE — 36415 COLL VENOUS BLD VENIPUNCTURE: CPT

## 2024-09-25 PROCEDURE — 85025 COMPLETE CBC W/AUTO DIFF WBC: CPT

## 2024-09-25 PROCEDURE — 82746 ASSAY OF FOLIC ACID SERUM: CPT | Performed by: INTERNAL MEDICINE

## 2024-09-25 PROCEDURE — 84466 ASSAY OF TRANSFERRIN: CPT | Performed by: INTERNAL MEDICINE

## 2024-09-25 PROCEDURE — 83615 LACTATE (LD) (LDH) ENZYME: CPT | Performed by: INTERNAL MEDICINE

## 2024-09-25 PROCEDURE — 83540 ASSAY OF IRON: CPT | Performed by: INTERNAL MEDICINE

## 2024-09-25 PROCEDURE — 83010 ASSAY OF HAPTOGLOBIN QUANT: CPT | Performed by: INTERNAL MEDICINE

## 2024-09-25 PROCEDURE — 85045 AUTOMATED RETICULOCYTE COUNT: CPT | Performed by: INTERNAL MEDICINE

## 2024-09-26 LAB
HGB A MFR BLD ELPH: 97.2 % (ref 96.4–98.8)
HGB A2 MFR BLD ELPH: 1.6 % (ref 1.8–3.2)
HGB F MFR BLD ELPH: 1.2 % (ref 0–2)
HGB FRACT BLD-IMP: ABNORMAL
HGB S MFR BLD ELPH: 0 %

## 2024-09-27 LAB — STFR SERPL-SCNC: 15.6 NMOL/L (ref 12.2–27.3)

## 2024-09-30 LAB — METHYLMALONATE SERPL-SCNC: 162 NMOL/L (ref 0–378)
